# Patient Record
Sex: MALE | Race: WHITE | NOT HISPANIC OR LATINO | Employment: FULL TIME | URBAN - METROPOLITAN AREA
[De-identification: names, ages, dates, MRNs, and addresses within clinical notes are randomized per-mention and may not be internally consistent; named-entity substitution may affect disease eponyms.]

---

## 2024-03-05 DIAGNOSIS — Z00.00 GENERAL MEDICAL EXAM: Primary | ICD-10-CM

## 2024-04-16 DIAGNOSIS — Z12.9 CANCER SCREENING: Primary | ICD-10-CM

## 2024-04-19 RX ORDER — LEVOTHYROXINE SODIUM 150 UG/1
150 TABLET ORAL
COMMUNITY
Start: 2024-03-13

## 2024-04-19 RX ORDER — TADALAFIL 5 MG/1
5 TABLET ORAL
COMMUNITY
Start: 2024-03-03

## 2024-04-19 RX ORDER — TELMISARTAN 80 MG/1
80 TABLET ORAL
COMMUNITY
Start: 2024-04-14

## 2024-04-19 RX ORDER — HYDROCHLOROTHIAZIDE 12.5 MG/1
12.5 TABLET ORAL
COMMUNITY
Start: 2024-04-14

## 2024-04-19 RX ORDER — LIOTHYRONINE SODIUM 5 UG/1
5 TABLET ORAL EVERY MORNING
COMMUNITY
Start: 2024-04-04

## 2024-04-19 RX ORDER — ROSUVASTATIN CALCIUM 10 MG/1
10 TABLET, COATED ORAL NIGHTLY
COMMUNITY
Start: 2024-04-14

## 2024-04-22 ENCOUNTER — CLINICAL SUPPORT (OUTPATIENT)
Dept: INTERNAL MEDICINE | Facility: CLINIC | Age: 64
End: 2024-04-22
Payer: COMMERCIAL

## 2024-04-22 VITALS
SYSTOLIC BLOOD PRESSURE: 152 MMHG | HEART RATE: 65 BPM | OXYGEN SATURATION: 100 % | WEIGHT: 209 LBS | BODY MASS INDEX: 31.67 KG/M2 | DIASTOLIC BLOOD PRESSURE: 87 MMHG | HEIGHT: 68 IN

## 2024-04-22 DIAGNOSIS — R73.9 HYPERGLYCEMIA: ICD-10-CM

## 2024-04-22 DIAGNOSIS — R79.89 LOW TESTOSTERONE IN MALE: ICD-10-CM

## 2024-04-22 DIAGNOSIS — E03.9 HYPOTHYROIDISM (ACQUIRED): ICD-10-CM

## 2024-04-22 DIAGNOSIS — E78.2 MIXED HYPERLIPIDEMIA: ICD-10-CM

## 2024-04-22 DIAGNOSIS — Z12.9 CANCER SCREENING: Primary | ICD-10-CM

## 2024-04-22 DIAGNOSIS — Z00.00 GENERAL MEDICAL EXAM: Primary | ICD-10-CM

## 2024-04-22 DIAGNOSIS — I10 PRIMARY HYPERTENSION: ICD-10-CM

## 2024-04-22 DIAGNOSIS — D22.9 MULTIPLE ATYPICAL SKIN MOLES: ICD-10-CM

## 2024-04-22 DIAGNOSIS — R06.83 SNORING: ICD-10-CM

## 2024-04-22 DIAGNOSIS — Z12.9 CANCER SCREENING: ICD-10-CM

## 2024-04-22 DIAGNOSIS — R94.31 ABNORMAL ECG: ICD-10-CM

## 2024-04-22 DIAGNOSIS — R01.1 CARDIAC MURMUR: ICD-10-CM

## 2024-04-22 DIAGNOSIS — H91.93 BILATERAL HEARING LOSS, UNSPECIFIED HEARING LOSS TYPE: ICD-10-CM

## 2024-04-22 PROBLEM — H91.90 HEARING LOSS: Status: ACTIVE | Noted: 2024-04-22

## 2024-04-22 PROBLEM — R73.03 PREDIABETES: Status: RESOLVED | Noted: 2024-04-22 | Resolved: 2024-04-22

## 2024-04-22 PROBLEM — R73.03 PREDIABETES: Status: ACTIVE | Noted: 2024-04-22

## 2024-04-22 LAB
(HCYS)2 SERPL-MCNC: 13.7 UMOL/L (ref 5.1–15.4)
ALBUMIN SERPL-MCNC: 4.3 G/DL (ref 3.4–4.8)
ALBUMIN/GLOB SERPL: 1.3 RATIO (ref 1.1–2)
ALP SERPL-CCNC: 63 UNIT/L (ref 40–150)
ALT SERPL-CCNC: 18 UNIT/L (ref 0–55)
APPEARANCE UR: CLEAR
AST SERPL-CCNC: 32 UNIT/L (ref 5–34)
BACTERIA #/AREA URNS AUTO: ABNORMAL /HPF
BASOPHILS # BLD AUTO: 0.05 X10(3)/MCL
BASOPHILS NFR BLD AUTO: 0.8 %
BILIRUB SERPL-MCNC: 1.8 MG/DL
BILIRUB UR QL STRIP.AUTO: NEGATIVE
BUN SERPL-MCNC: 21.5 MG/DL (ref 8.4–25.7)
CALCIUM SERPL-MCNC: 9.8 MG/DL (ref 8.8–10)
CHLORIDE SERPL-SCNC: 101 MMOL/L (ref 98–107)
CHOLEST SERPL-MCNC: 150 MG/DL
CHOLEST/HDLC SERPL: 3 {RATIO} (ref 0–5)
CK SERPL-CCNC: 219 U/L (ref 30–200)
CO2 SERPL-SCNC: 29 MMOL/L (ref 23–31)
COLOR UR AUTO: ABNORMAL
CREAT SERPL-MCNC: 1.2 MG/DL (ref 0.73–1.18)
CRP SERPL HS-MCNC: 2.17 MG/L
DEPRECATED CALCIDIOL+CALCIFEROL SERPL-MC: 83.4 NG/ML (ref 30–80)
DLCO: NORMAL
EOSINOPHIL # BLD AUTO: 0.1 X10(3)/MCL (ref 0–0.9)
EOSINOPHIL NFR BLD AUTO: 1.7 %
ERYTHROCYTE [DISTWIDTH] IN BLOOD BY AUTOMATED COUNT: 11.6 % (ref 11.5–17)
EST. AVERAGE GLUCOSE BLD GHB EST-MCNC: 122.6 MG/DL
FERRITIN SERPL-MCNC: 67.48 NG/ML (ref 21.81–274.66)
FEV1/FVC: 65.6 %
FEV1: 2.54 LITERS
FVC: 3.87 LITERS
GFR SERPLBLD CREATININE-BSD FMLA CKD-EPI: >60 MLS/MIN/1.73/M2
GLOBULIN SER-MCNC: 3.3 GM/DL (ref 2.4–3.5)
GLUCOSE SERPL-MCNC: 118 MG/DL (ref 82–115)
GLUCOSE UR QL STRIP.AUTO: NORMAL
GROUP & RH: NORMAL
HBA1C MFR BLD: 5.9 %
HCT VFR BLD AUTO: 51.4 % (ref 42–52)
HCV AB SERPL QL IA: NONREACTIVE
HDLC SERPL-MCNC: 48 MG/DL (ref 35–60)
HGB BLD-MCNC: 17.8 G/DL (ref 14–18)
HIV 1+2 AB+HIV1 P24 AG SERPL QL IA: NONREACTIVE
HYALINE CASTS #/AREA URNS LPF: ABNORMAL /LPF
IMM GRANULOCYTES # BLD AUTO: 0.03 X10(3)/MCL (ref 0–0.04)
IMM GRANULOCYTES NFR BLD AUTO: 0.5 %
KETONES UR QL STRIP.AUTO: NEGATIVE
LDLC SERPL CALC-MCNC: 82 MG/DL (ref 50–140)
LEUKOCYTE ESTERASE UR QL STRIP.AUTO: NEGATIVE
LYMPHOCYTES # BLD AUTO: 1.59 X10(3)/MCL (ref 0.6–4.6)
LYMPHOCYTES NFR BLD AUTO: 26.9 %
MCH RBC QN AUTO: 29.8 PG (ref 27–31)
MCHC RBC AUTO-ENTMCNC: 34.6 G/DL (ref 33–36)
MCV RBC AUTO: 86.1 FL (ref 80–94)
MONOCYTES # BLD AUTO: 0.58 X10(3)/MCL (ref 0.1–1.3)
MONOCYTES NFR BLD AUTO: 9.8 %
MUCOUS THREADS URNS QL MICRO: ABNORMAL /LPF
NEUTROPHILS # BLD AUTO: 3.57 X10(3)/MCL (ref 2.1–9.2)
NEUTROPHILS NFR BLD AUTO: 60.3 %
NITRITE UR QL STRIP.AUTO: NEGATIVE
NRBC BLD AUTO-RTO: 0 %
PH UR STRIP.AUTO: 7 [PH]
PLATELET # BLD AUTO: 205 X10(3)/MCL (ref 130–400)
PMV BLD AUTO: 11.1 FL (ref 7.4–10.4)
POTASSIUM SERPL-SCNC: 4.5 MMOL/L (ref 3.5–5.1)
PROT SERPL-MCNC: 7.6 GM/DL (ref 5.8–7.6)
PROT UR QL STRIP.AUTO: NEGATIVE
PSA SERPL-MCNC: 1.93 NG/ML
RBC # BLD AUTO: 5.97 X10(6)/MCL (ref 4.7–6.1)
RBC #/AREA URNS AUTO: ABNORMAL /HPF
RBC UR QL AUTO: NEGATIVE
SODIUM SERPL-SCNC: 139 MMOL/L (ref 136–145)
SP GR UR STRIP.AUTO: 1.02 (ref 1–1.03)
SQUAMOUS #/AREA URNS LPF: ABNORMAL /HPF
T3FREE SERPL-MCNC: 3.28 PG/ML (ref 1.58–3.91)
T4 FREE SERPL-MCNC: 1.09 NG/DL (ref 0.7–1.48)
TESTOST SERPL-MCNC: 865.38 NG/DL (ref 220.91–715.81)
TLC: NORMAL
TRIGL SERPL-MCNC: 99 MG/DL (ref 34–140)
TSH SERPL-ACNC: 1.83 UIU/ML (ref 0.35–4.94)
URATE SERPL-MCNC: 8.8 MG/DL (ref 3.5–7.2)
UROBILINOGEN UR STRIP-ACNC: NORMAL
VLDLC SERPL CALC-MCNC: 20 MG/DL
WBC # SPEC AUTO: 5.92 X10(3)/MCL (ref 4.5–11.5)
WBC #/AREA URNS AUTO: ABNORMAL /HPF

## 2024-04-22 PROCEDURE — 80061 LIPID PANEL: CPT

## 2024-04-22 PROCEDURE — 82306 VITAMIN D 25 HYDROXY: CPT

## 2024-04-22 PROCEDURE — 99499 UNLISTED E&M SERVICE: CPT | Mod: ,,, | Performed by: FAMILY MEDICINE

## 2024-04-22 PROCEDURE — 80053 COMPREHEN METABOLIC PANEL: CPT

## 2024-04-22 PROCEDURE — 36415 COLL VENOUS BLD VENIPUNCTURE: CPT | Mod: ,,, | Performed by: FAMILY MEDICINE

## 2024-04-22 PROCEDURE — 84443 ASSAY THYROID STIM HORMONE: CPT

## 2024-04-22 PROCEDURE — 83090 ASSAY OF HOMOCYSTEINE: CPT

## 2024-04-22 PROCEDURE — 84550 ASSAY OF BLOOD/URIC ACID: CPT

## 2024-04-22 PROCEDURE — 81479 UNLISTED MOLECULAR PATHOLOGY: CPT | Mod: WS,,, | Performed by: FAMILY MEDICINE

## 2024-04-22 PROCEDURE — 93005 ELECTROCARDIOGRAM TRACING: CPT | Mod: ,,, | Performed by: FAMILY MEDICINE

## 2024-04-22 PROCEDURE — 86141 C-REACTIVE PROTEIN HS: CPT

## 2024-04-22 PROCEDURE — 86901 BLOOD TYPING SEROLOGIC RH(D): CPT | Performed by: FAMILY MEDICINE

## 2024-04-22 PROCEDURE — 92551 PURE TONE HEARING TEST AIR: CPT | Mod: ,,, | Performed by: FAMILY MEDICINE

## 2024-04-22 PROCEDURE — 82550 ASSAY OF CK (CPK): CPT

## 2024-04-22 PROCEDURE — 84481 FREE ASSAY (FT-3): CPT

## 2024-04-22 PROCEDURE — 84153 ASSAY OF PSA TOTAL: CPT

## 2024-04-22 PROCEDURE — 84439 ASSAY OF FREE THYROXINE: CPT

## 2024-04-22 PROCEDURE — 82728 ASSAY OF FERRITIN: CPT

## 2024-04-22 PROCEDURE — 84403 ASSAY OF TOTAL TESTOSTERONE: CPT

## 2024-04-22 PROCEDURE — 93010 ELECTROCARDIOGRAM REPORT: CPT | Mod: ,,, | Performed by: INTERNAL MEDICINE

## 2024-04-22 PROCEDURE — 85025 COMPLETE CBC W/AUTO DIFF WBC: CPT

## 2024-04-22 PROCEDURE — 86803 HEPATITIS C AB TEST: CPT

## 2024-04-22 PROCEDURE — 87389 HIV-1 AG W/HIV-1&-2 AB AG IA: CPT

## 2024-04-22 PROCEDURE — 81001 URINALYSIS AUTO W/SCOPE: CPT

## 2024-04-22 PROCEDURE — 99387 INIT PM E/M NEW PAT 65+ YRS: CPT | Mod: ,,, | Performed by: FAMILY MEDICINE

## 2024-04-22 PROCEDURE — 94010 BREATHING CAPACITY TEST: CPT | Mod: ,,, | Performed by: FAMILY MEDICINE

## 2024-04-22 PROCEDURE — 99172 OCULAR FUNCTION SCREEN: CPT | Mod: ,,, | Performed by: FAMILY MEDICINE

## 2024-04-22 PROCEDURE — 83036 HEMOGLOBIN GLYCOSYLATED A1C: CPT

## 2024-04-22 PROCEDURE — 0358T BIA WHOLE BODY: CPT | Mod: ,,, | Performed by: FAMILY MEDICINE

## 2024-04-22 RX ORDER — GUAIFENESIN/PHENYLPROPANOLAMIN
500 EXPECTORANT ORAL DAILY
COMMUNITY

## 2024-04-22 RX ORDER — CHOLECALCIFEROL (VITAMIN D3) 25 MCG
1000 TABLET ORAL DAILY
COMMUNITY

## 2024-04-22 NOTE — ASSESSMENT & PLAN NOTE
New murmur. Recommend further evaluation and treatment by a cardiologists. Will hold off on doing the exercise stress test today due to new finding.

## 2024-04-22 NOTE — ASSESSMENT & PLAN NOTE
Fasting glucose and HbA1c is elevated and may be due to prediabetes.   Recommend comparing this to prior lab results or repeating testing at a later date.   Follow a healthy diet that is low in processed carbs and simple sugars. See the ADA website diabetes.org

## 2024-04-22 NOTE — ASSESSMENT & PLAN NOTE
Keep a blood pressure log and follow up with your primary care physician if your home readings are consistently higher than 140/90.   Continue taking your medications as prescribed.

## 2024-04-22 NOTE — PROGRESS NOTES
[facility]  Marleny Driscoll MD      Subjective      Rich Sanford is a 64 y.o. year old male, who presents today for a preventive medical evaluation.      This is Mr. Rich Sanford's first visit to the [facility] for a wellness visit. He reports that since he was last seen by his physician, which was in 2023, he has had no major illnesses or injuries.     Mr. Sanford states that as a child he had a heart murmur and was told 10 years ago by a doctor that they heard a murmur but not since then. He reports no chest pain or dyspnea on exertion. He did mention that last year he had a harder time hiking than years past. He was able to complete the hike. His home blood pressure readings are controlled in the 130s/70s. He exercises most days with weight lifting and currently is not doing much aerobic exercises.       CURRENT MEDICAL PROBLEMS     He has high blood pressure and hyperlipidemia for which he takes daily medications.   He also has hypothroidism which is also treated. He has not had surgery or radiation on his thyroid in the past.   Mr. Sanford has low testosterone and takes weekly supplement injections which are working well.   He reports low vitamin D levels for which he takes supplements as well.   Mr. Sanford has bilateral hearing loss and has hearing aids.     Current Medications    Current Outpatient Medications:     hydroCHLOROthiazide (HYDRODIURIL) 12.5 MG Tab, Take 12.5 mg by mouth., Disp: , Rfl:     levothyroxine (SYNTHROID) 150 MCG tablet, Take 150 mcg by mouth., Disp: , Rfl:     liothyronine (CYTOMEL) 5 MCG Tab, Take 5 mcg by mouth every morning., Disp: , Rfl:     NON FORMULARY MEDICATION, Zinc+Calcium tablet-once daily, Disp: , Rfl:     NON FORMULARY MEDICATION, Bio-X4, Disp: , Rfl:     NON FORMULARY MEDICATION, Amazing Grass Greens Blend Super Food Drink, Disp: , Rfl:     rosuvastatin (CRESTOR) 10 MG tablet, Take 10 mg by mouth every evening., Disp: ,  Rfl:     saw palmetto 500 MG capsule, Take 500 mg by mouth once daily., Disp: , Rfl:     tadalafiL (CIALIS) 5 MG tablet, Take 5 mg by mouth., Disp: , Rfl:     telmisartan (MICARDIS) 80 MG Tab, Take 80 mg by mouth., Disp: , Rfl:     vitamin D (VITAMIN D3) 1000 units Tab, Take 1,000 Units by mouth once daily., Disp: , Rfl:     NON FORMULARY MEDICATION, Testosterone Cypionate 200mg/ml-0.4ml Weekly IM Injection, Disp: , Rfl:      Care Team 274}  Patient Care Team:  Edilberto Calvo MD as PCP - General  Wood Woodward MD (Dermatology)  Tim Burks MD as Consulting Physician (General Surgery)  Reddy Narayanan (Inactive) (Colon and Rectal Surgery)  Carlos Crouch Sr., MD as Consulting Physician      PAST MEDICAL HISTORY   274}  Immunizations:  He has his tetanus booster in the last 10 years.  He has not received the annual flu vaccine.  He  has not received his Pneumonia vaccine.   He reports he has taken both Hepatitis A and B vaccines.  He has not taken his Shingles vaccine.   He  has taken his Covid Vaccine.     Drug Allergies:274}  Review of patient's allergies indicates:  No Known Allergies     Medical Illnesses:  See Current Medical Problems and Review of System.    Surgical Illnesses:   Past Surgical History:   Procedure Laterality Date    DISTAL BICEPS TENDON REPAIR Right 2007    NEVUS EXCISION  1963    TONSILLECTOMY  1963        Diagnostic Studies (year completed):  EKG, 2017  CT of Chest, 2021  Colonoscopy, done at 37 yo and 47yo- both clear. Last one done at 57yo. No polyps.        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 He lives at home with his wife of 19 years. They have 4 children. He has 3 grandchildren. Ages 8, 7, 3.     Personal Habits  He does not smoke or use tobacco products. He drinks approximately 2-3 alcoholic beverages per month. He drinks approximately 12 caffeinated beverages per week. He reports that he is currently following a  nonrestrictive diet. Nearly half of his meals are prepared at home and eats out 14 times per week. He drinks 28 glasses of water a week. On average, he obtains 7 hours of sleep each night.    Exercise History  Currently, he exercises on average 5 days a week for 30 minutes or more each time. For resistance training he lifts weight, 7 days per week. And for flexibility training he stretches during weight lifting.     Stress Factors  The patient considers his home life to be low stress and his occupation to be of moderate stress. His greatest source of worry or concern is his  correction plan and societal events . He feels that he manages his stress fairly well most of the time. He rates his emotional outlook on life as generally happy. He rates his overall health as good.    FAMILY HISTORY     The patient's father  at the age of 67 from esophageal cancer. He was an alcoholic and smoker. His mother is alive at the age of 90 years old and has had breast cancer in her 50s. He has 2 siblings, ages 61 and 68 who have von Willebrand's disease. He has 3 biological children, ages 37, 39 and 40, and reports one of his children has von Willebrand's disease.    REVIEW OF SYSTEMS     Review of Systems   Constitutional: No fever, no chills, no sweats, no weakness, no fatigue  Eye: No recent visual problems, no blurry vision, no visual disturbances  ENT: No ear pain, no change in hearing, no nasal congestion, no sore throat  Respiratory: No shortness of breath, no cough, no wheezing, + snoring with witness apneic episodes  Cardiovascular: No chest pain, no palpitations, no peripheral edema, no calf pain or swelling  Gastrointestinal: No nausea, no vomiting, no diarrhea, no constipation, no abdominal pain, no melena or rectal bleeding  Male : No difficulty with urine stream, strength or flow rate. No polyuria, no nocturia, no sexual problems  Genitourinary: No dysuria, no hematuria, no excessive urination, no frequent UTIs,  "no urinary incontinence  Immunologic: No recurrent fevers, no malaise  Hematology/lymphatics: No swollen lymph glands  Musculoskeletal: No joint pain today, reports past left toe pain and right lateral foot pain-red, swelling,  no joint swelling today, no trouble with gait.   Integumentary: No rashes, no skin lesions  Neurologic: Alert, oriented ×4, no abnormal balance, no confusion, no numbness, no tingling, no headache  Psychiatric: No anxiety, no depression, no sleeping problems        Objective     PHYSICAL EXAM   274}  BP (!) 152/87 (BP Location: Left arm, Patient Position: Sitting, BP Method: Large (Automatic))   Pulse 65   Ht 5' 8" (1.727 m)   Wt 94.8 kg (209 lb)   SpO2 100%   BMI 31.78 kg/m²     Physical Exam   General: Alert and oriented, no acute distress. well nourished, well kept, good hydration  Eye: Pupils are equal, round and reactive to light, extraocular movements are intact  HEENT: OP clear, moist mucus membranes, no cervical LAD, ear canals clear- normal TM bilaterally  Respiratory: Lungs are clear to auscultation, breath sounds are equal and symmetric  Cardiovascular: Normal rate, regular rhythm, +3/6 systolic ejection murmur loudest at RUSB, no gallop, no edema  Gastrointestinal: Soft, nontender, nondistended, normal bowel sounds, no organomegaly  Genitourinary: No CVA tenderness  Musculoskeletal: Normal range of motion, no swelling, normal gait.  Integumentary: Warm, dry, pink, no rash, multiple benign and atypical moles located on sun exposed areas.   Neurologic: Alert, oriented, no focal deficits, normal reflexes  Psychiatric: Cooperative, appropriate mood and affect, normal judgment, nonsuicidal    LABORATORY RESULTS    274}    Clinical Support on 04/22/2024   Component Date Value Ref Range Status    FEV1 04/22/2024 2.54  liters Final    FVC 04/22/2024 3.87  liters Final    FEV1/FVC 04/22/2024 65.6  % Final    QRS Duration 04/22/2024 90  ms Final    OHS QTC Calculation 04/22/2024 443  " ms Final    Vit D 25 OH 04/22/2024 83.4 (H)  30.0 - 80.0 ng/mL Final    Uric Acid 04/22/2024 8.8 (H)  3.5 - 7.2 mg/dL Final    Color, UA 04/22/2024 Light-Yellow  Yellow, Light-Yellow, Dark Yellow, Leona, Straw Final    Appearance, UA 04/22/2024 Clear  Clear Final    Specific Gravity, UA 04/22/2024 1.017  1.005 - 1.030 Final    pH, UA 04/22/2024 7.0  5.0 - 8.5 Final    Protein, UA 04/22/2024 Negative  Negative Final    Glucose, UA 04/22/2024 Normal  Negative, Normal Final    Ketones, UA 04/22/2024 Negative  Negative Final    Blood, UA 04/22/2024 Negative  Negative Final    Bilirubin, UA 04/22/2024 Negative  Negative Final    Urobilinogen, UA 04/22/2024 Normal  0.2, 1.0, Normal Final    Nitrites, UA 04/22/2024 Negative  Negative Final    Leukocyte Esterase, UA 04/22/2024 Negative  Negative Final    WBC, UA 04/22/2024 0-5  None Seen, 0-2, 3-5, 0-5 /HPF Final    Bacteria, UA 04/22/2024 None Seen  None Seen /HPF Final    Squamous Epithelial Cells, UA 04/22/2024 None Seen  None Seen /HPF Final    Mucous, UA 04/22/2024 Trace (A)  None Seen /LPF Final    Hyaline Casts, UA 04/22/2024 None Seen  None Seen /lpf Final    RBC, UA 04/22/2024 None Seen  None Seen, 0-2, 3-5, 0-5 /HPF Final    TSH 04/22/2024 1.831  0.350 - 4.940 uIU/mL Final    Testosterone Total 04/22/2024 865.38 (H)  220.91 - 715.81 ng/dL Final    Thyroxine Free 04/22/2024 1.09  0.70 - 1.48 ng/dL Final    T3 Free 04/22/2024 3.28  1.58 - 3.91 pg/mL Final    Prostate Specific Antigen 04/22/2024 1.93  <=4.00 ng/mL Final    Cholesterol Total 04/22/2024 150  <=200 mg/dL Final    HDL Cholesterol 04/22/2024 48  35 - 60 mg/dL Final    Triglyceride 04/22/2024 99  34 - 140 mg/dL Final    Cholesterol/HDL Ratio 04/22/2024 3  0 - 5 Final    Very Low Density Lipoprotein 04/22/2024 20   Final    LDL Cholesterol 04/22/2024 82.00  50.00 - 140.00 mg/dL Final    Homocysteine Total 04/22/2024 13.7  5.1 - 15.4 umol/L Final    HIV 04/22/2024 Nonreactive  Nonreactive Final    Hep C Ab  Interp 04/22/2024 Nonreactive  Nonreactive Final    Hemoglobin A1c 04/22/2024 5.9  <=7.0 % Final    Estimated Average Glucose 04/22/2024 122.6  mg/dL Final    Ferritin Level 04/22/2024 67.48  21.81 - 274.66 ng/mL Final    C-Reactive Protein High Sensitivity 04/22/2024 2.17  <=5.00 mg/L Final    Sodium Level 04/22/2024 139  136 - 145 mmol/L Final    Potassium Level 04/22/2024 4.5  3.5 - 5.1 mmol/L Final    Chloride 04/22/2024 101  98 - 107 mmol/L Final    Carbon Dioxide 04/22/2024 29  23 - 31 mmol/L Final    Glucose Level 04/22/2024 118 (H)  82 - 115 mg/dL Final    Blood Urea Nitrogen 04/22/2024 21.5  8.4 - 25.7 mg/dL Final    Creatinine 04/22/2024 1.20 (H)  0.73 - 1.18 mg/dL Final    Calcium Level Total 04/22/2024 9.8  8.8 - 10.0 mg/dL Final    Protein Total 04/22/2024 7.6  5.8 - 7.6 gm/dL Final    Albumin Level 04/22/2024 4.3  3.4 - 4.8 g/dL Final    Globulin 04/22/2024 3.3  2.4 - 3.5 gm/dL Final    Albumin/Globulin Ratio 04/22/2024 1.3  1.1 - 2.0 ratio Final    Bilirubin Total 04/22/2024 1.8 (H)  <=1.5 mg/dL Final    Alkaline Phosphatase 04/22/2024 63  40 - 150 unit/L Final    Alanine Aminotransferase 04/22/2024 18  0 - 55 unit/L Final    Aspartate Aminotransferase 04/22/2024 32  5 - 34 unit/L Final    eGFR 04/22/2024 >60  mls/min/1.73/m2 Final    Creatine Kinase 04/22/2024 219 (H)  30 - 200 U/L Final    Group & Rh 04/22/2024 O POS   Final    WBC 04/22/2024 5.92  4.50 - 11.50 x10(3)/mcL Final    RBC 04/22/2024 5.97  4.70 - 6.10 x10(6)/mcL Final    Hgb 04/22/2024 17.8  14.0 - 18.0 g/dL Final    Hct 04/22/2024 51.4  42.0 - 52.0 % Final    MCV 04/22/2024 86.1  80.0 - 94.0 fL Final    MCH 04/22/2024 29.8  27.0 - 31.0 pg Final    MCHC 04/22/2024 34.6  33.0 - 36.0 g/dL Final    RDW 04/22/2024 11.6  11.5 - 17.0 % Final    Platelet 04/22/2024 205  130 - 400 x10(3)/mcL Final    MPV 04/22/2024 11.1 (H)  7.4 - 10.4 fL Final    Neut % 04/22/2024 60.3  % Final    Lymph % 04/22/2024 26.9  % Final    Mono % 04/22/2024 9.8  %  Final    Eos % 04/22/2024 1.7  % Final    Basophil % 04/22/2024 0.8  % Final    Lymph # 04/22/2024 1.59  0.6 - 4.6 x10(3)/mcL Final    Neut # 04/22/2024 3.57  2.1 - 9.2 x10(3)/mcL Final    Mono # 04/22/2024 0.58  0.1 - 1.3 x10(3)/mcL Final    Eos # 04/22/2024 0.10  0 - 0.9 x10(3)/mcL Final    Baso # 04/22/2024 0.05  <=0.2 x10(3)/mcL Final    IG# 04/22/2024 0.03  0 - 0.04 x10(3)/mcL Final    IG% 04/22/2024 0.5  % Final    NRBC% 04/22/2024 0.0  % Final       Any results pending at the time of completion of note will be routed to the physician and patient for review and outreach performed if indicated.     IMAGING RESULTS     see official reports    Abdominal US--Cholelithiasis   Carotid US--No evidence of a hemodynamically significant carotid bifurcation stenosis.   Pelvic US--Unremarkable appearance of the bladder. 29 g prostate.  DEXA scan--Normal bone marrow density   Calcium Score--Your total calcium score is 100.  Low to Moderate coronary heart disease risk  Small nodules in the right middle lobe, the largest measuring 5.5 mm in diameter.  In a patient at low risk for malignancy, no further follow-up suggested.  In a high risk patient, such as a smoker, 12 month follow-up low-dose CT suggested.      IN OFFICE TEST RESULTS     Vision/Auditory  Hearing Screening    500Hz 1000Hz 2000Hz 4000Hz   Right ear 40 Fail Fail Fail   Left ear 40 40 Fail Fail     Vision Screening    Right eye Left eye Both eyes   Without correction   20/40   With correction      Comments: Last Eye exam-4/2024 at [facility] in [place], [place]    Far Acuity 20/40  Near Acuity 20/100    Lasix surgery approx 20 yrs ago.      Spirometry   FVC 91.4% predictive  FEV1 80.6% predictive  FEV1/FVC 88.1% predictive  Interpretation: Mild obstruction    Resting ECG:  Vent. Rate : 067 BPM     Atrial Rate : 067 BPM         P-R Int : 152 ms          QRS Dur : 090 ms          QT Int : 420 ms       P-R-T Axes : 050 036 143 degrees         QTc Int : 443 ms      Normal sinus rhythm   ST and T wave abnormality, consider lateral ischemia   Abnormal ECG     Exercise Treadmill Stress Test  Deferred today due to abnormal physical exam finding of murmur.       InBody Assessment:   04/23/2024    BMI: 31.8    Percent Body Fat: 29.7%    Visceral Fat Area: 120.9 cm2    Recommendations: Body fat mass loss of 36lbs          Assessment    IMPRESSION & PLAN    274}  Cancer Screening    Impression   All recommended cancer screening exams are up to date.    Plan Continue yearly wellness visits as planned.      Immunizations    Impression Not all recommended immunizations are up to date. These include: Influenza vaccine, Shingles vaccine, RSV, and Pneumonia vaccine   Plan It is recommended that you speak to your primary care physician about receiving your vaccinations.      Physical Exam    Impression Normal except for:   -Elevated blood pressure reading: keep a bp log and follow up with your primary care physician  -Cardiac murmur: recommend further evaluation and treatment by a cardiologist  -Multiple benign and atypical moles: recommend further evaluation by a dermatologist    Plan See above       Laboratory Results    Impression Normal except for:   -Elevated creatinine with normal GFR: Maintain a normal blood pressure and stay hydrated. Avoid antiinflammatories and follow up with your primary  care physician for further monitoring.   -Elevated fasting sugar with mildly elevated HbA1c: this is Prediabetes. See recommendations listed at the end of the note  -Elevated Uric Acid: consider treatment to keep uric acid low to prevent gout flares and protect kidney function. Recommend discussing with your primary care physician.   -Elevated total bilirubin with normal liver function tests:  -Elevated CPK: This is nonspecific finding. No further testing needed at this time. Likely due to recent exercise routine.    Plan See above     Imaging Results    Impression Normal except for:    -Incidental lung nodules: no further testing needed  -Low to moderate coronary artery disease risk: no further testing needed.   -Gallstones present on abdominal ultrasound: no further testing needed  -Mildly enlarged prostate: no further testing needed   Plan See above     Hearing Screening    Impression Abnormal   Plan Recommend wearing hearing aids. Repeat in 1 year.     Vision Screening    Impression Normal   Plan Repeat in 1 year.     Lung Function Screening   Impression Abnormal   Plan Consider further testing if cardiology testing is normal.  Repeat in 1 year or sooner if problems arise.      Electrocardiogram   Impression Abnormal.   Plan Follow up with cardiology.     Cardiovascular Risk Assessment   Impression NO score: 11.1%   Intermediate Risk (7.5 to 19.9%)   Plan Follow a heart healthy diet (see details below), maintain a healthy blood pressure (<140/80) and weight (BMI 25-29.9). Follow a regular aerobic exercise routine (see exercise prescription below)  Continue your statin therapy to keep your LDL less than 100mg/dL  Consider a diet such as the Dietary Approaches to Stop Hypertension (DASH) diet which can help to lower blood pressure.     In Body Assessment   Impression  See results and recommendations above   Plan Exercise Prescription         Type: Aerobic: walking, stationary cycling, aquatic exercise, running         Frequency: 3-5 sessions/week         Intensity: Moderate (RPE: 10-13)          Time: 30 minutes (150 minutes/week)              Plus         Type: Strength/Resistance Training: exercises using resistance bands, weight machines, handheld  weights or body weight         Frequency: 2-3 session/week               Plus         Type: Flexibility training: pre/post workout stretching, yoga, evans chi         Frequency: 5-7 sessions/week         Time: 5-10 minutes       Plan Diet Recommendations:  Follow a heart healthy diet such as the Mediterranean-style diet.   Eat an overall healthy  "dietary pattern that emphasizes:   -a wide variety of fruits and vegetables   -whole grains and products made up mostly of whole grains   -healthy sources of protein (mostly plants such as legumes and nuts; fish and seafood; low fat or nonfat dairy; and, if you eat meat and poultry, ensuring it is lean and unprocessed)   -liquid non-tropical vegetable oils   -minimally processed foods   -minimized intake of added sugars   -food prepared with little or no salt   -limited or preferably no alcohol intake        TDEE: 2,795 calories  This is your "Total Daily Energy Expenditure" and is the amount of calories required to maintain current body weight when your activity level is factored in.    Recommended daily calorie intake to promote weight loss with moderate exercise (3-5 days/week): 2,295 calories/day    BMR: 1,811 calories  This is your "Basal Metabolic Rate". This is the amount of calories required to maintain current body weight with no activity.         MEDICAL PROBLEMS ADDRESSED TODAY     1. General medical exam  -     see above    2. Cancer screening  -     [facility] Multi-Cancer Screen    3. Cardiac murmur  Assessment & Plan:  New murmur. Recommend further evaluation and treatment by a cardiologists. Will hold off on doing the exercise stress test today due to new finding.   Orders:  -     Ambulatory referral/consult to Cardiology; Future; Expected date: 04/29/2024    4. Snoring  Assessment & Plan:  Recommend a sleep study.     5. Primary hypertension  Assessment & Plan:  Keep a blood pressure log and follow up with your primary care physician if your home readings are consistently higher than 140/90.   Continue taking your medications as prescribed.   Orders:  -     Ambulatory referral/consult to Cardiology; Future; Expected date: 04/29/2024    6. Mixed hyperlipidemia  Assessment & Plan:  Controlled. Continue your current medication and follow up with your doctor as planned.   Orders:  -     Ambulatory " referral/consult to Cardiology; Future; Expected date: 04/29/2024    7. Low testosterone in male  Assessment & Plan:  Controlled. Continue your current medication and follow up with your doctor as planned.     8. Bilateral hearing loss, unspecified hearing loss type    9. Hypothyroidism (acquired)  Assessment & Plan:  Controlled. Continue your current medications and follow up with your PCP as planned.     10. Hyperglycemia  Assessment & Plan:  Fasting glucose and HbA1c is elevated and may be due to prediabetes.   Recommend comparing this to prior lab results or repeating testing at a later date.   Follow a healthy diet that is low in processed carbs and simple sugars. See the ADA website diabetes.org    11. Multiple atypical skin moles  Assessment & Plan:  Recommend further evaluation and treatment by a dermatologist and treatment is indicated.     12. Abnormal ECG  Assessment & Plan:  See cardiology. Referral in chart.       FINAL RECOMMENDATIONS     See cardiology for further evaluation and treatment of your cardiac murmur and for further cardiac testing if indicated.   See dermatology for further evaluation and treatment of your numerous atypical moles.   Discuss your elevated uric acid level with your primary care physician and the need for treatment.   Get a sleep study to evaluate your snoring and apneic episodes while sleeping.   Continue to monitor your blood pressure at home and report any consistently elevated readings to your doctor.   Follow a low sugar (diabetic diet) and follow up with your primary care physician regularly to ensure you do not develop diabetes.  Consider getting caught up on your vaccinations. Speak to your primary care physician about these: Pneumonia, Shingles, Influenza and RSV    Early identification and prevention are the keys to maintaining overall health and prevention of chronic diseases. For this reason, I recommend yearly physical examinations through this program or with  your primary care physician.     Follow up in about 1 year (around 4/22/2025) for an Executive Health Physical.    It was a pleasure taking care of you, Mr. Bentone. Thank you for using the [facility].            [facility]  [address]   [place], [place]  [address]

## 2024-04-23 PROBLEM — R94.31 ABNORMAL ECG: Status: ACTIVE | Noted: 2024-04-23

## 2024-04-23 LAB
OHS QRS DURATION: 90 MS
OHS QTC CALCULATION: 443 MS

## 2024-04-25 ENCOUNTER — TELEPHONE (OUTPATIENT)
Dept: HEMATOLOGY/ONCOLOGY | Facility: CLINIC | Age: 64
End: 2024-04-25
Payer: COMMERCIAL

## 2024-05-02 ENCOUNTER — TELEPHONE (OUTPATIENT)
Dept: HEMATOLOGY/ONCOLOGY | Facility: CLINIC | Age: 64
End: 2024-05-02
Payer: COMMERCIAL

## 2024-05-02 LAB — GALLERI NO PREDICTED SIGNAL ORIGIN: NORMAL

## 2024-05-02 NOTE — TELEPHONE ENCOUNTER
"I called patient to review Galleri results "No cancer signal detected", and reminded him this was not a replacement of cancer screening. This is a blood test for a right here and right now, and Fede recommends annual screening. Patient verbalized understanding and was grateful for the call. No further questions at this time.  "

## 2025-02-28 DIAGNOSIS — Z00.00 GENERAL MEDICAL EXAM: Primary | ICD-10-CM

## 2025-04-17 ENCOUNTER — TELEPHONE (OUTPATIENT)
Dept: INTERNAL MEDICINE | Facility: CLINIC | Age: 65
End: 2025-04-17
Payer: COMMERCIAL

## 2025-04-17 DIAGNOSIS — Z00.00 GENERAL MEDICAL EXAM: Primary | ICD-10-CM

## 2025-04-17 RX ORDER — ROSUVASTATIN CALCIUM 20 MG/1
20 TABLET, COATED ORAL
COMMUNITY
Start: 2025-04-07

## 2025-04-17 NOTE — LETTER
This communication is flagged as high priority.        AUTHORIZATION FOR RELEASE OF   CONFIDENTIAL INFORMATION    Dear Medical Records,    We are seeing Rich Sanford, date of birth 1960, in the clinic at formerly [facility] on . Rich Sanford has an outstanding lab/procedure at the time we reviewed his chart. In order to help keep his health information updated, he has authorized us to request the following medical record(s):        (  )  MAMMOGRAM                                      (  )  COLONOSCOPY      (  )  PAP SMEAR                                          (  )  OUTSIDE LAB RESULTS     (  )  DEXA SCAN                                          (  )  EYE EXAM            (  )  FOOT EXAM                                          (  )  ENTIRE RECORD     (  )  OUTSIDE IMMUNIZATIONS                 (  x)  Progress Note & ETT______________         Please fax records to [facility], Dr. Fallon McManus, [phone]     If you have any questions, please contact Sera Regalado LPN  at [phone].           Patient Name: Rich Sanford  : 1960  Patient Phone #: [phone]

## 2025-04-21 ENCOUNTER — CLINICAL SUPPORT (OUTPATIENT)
Dept: INTERNAL MEDICINE | Facility: CLINIC | Age: 65
End: 2025-04-21
Payer: COMMERCIAL

## 2025-04-21 VITALS
WEIGHT: 202 LBS | BODY MASS INDEX: 30.62 KG/M2 | HEART RATE: 66 BPM | TEMPERATURE: 98 F | DIASTOLIC BLOOD PRESSURE: 87 MMHG | HEIGHT: 68 IN | SYSTOLIC BLOOD PRESSURE: 151 MMHG | OXYGEN SATURATION: 99 %

## 2025-04-21 DIAGNOSIS — R01.1 CARDIAC MURMUR: ICD-10-CM

## 2025-04-21 DIAGNOSIS — E79.0 HYPERURICEMIA: ICD-10-CM

## 2025-04-21 DIAGNOSIS — R73.9 HYPERGLYCEMIA: ICD-10-CM

## 2025-04-21 DIAGNOSIS — Z00.00 GENERAL MEDICAL EXAM: Primary | ICD-10-CM

## 2025-04-21 DIAGNOSIS — R94.31 ABNORMAL ECG: ICD-10-CM

## 2025-04-21 DIAGNOSIS — R73.03 PREDIABETES: ICD-10-CM

## 2025-04-21 DIAGNOSIS — D22.9 ATYPICAL MOLE: ICD-10-CM

## 2025-04-21 DIAGNOSIS — E78.2 MIXED HYPERLIPIDEMIA: ICD-10-CM

## 2025-04-21 DIAGNOSIS — E03.9 HYPOTHYROIDISM (ACQUIRED): ICD-10-CM

## 2025-04-21 DIAGNOSIS — R79.89 LOW TESTOSTERONE IN MALE: ICD-10-CM

## 2025-04-21 DIAGNOSIS — I10 PRIMARY HYPERTENSION: ICD-10-CM

## 2025-04-21 LAB
(HCYS)2 SERPL-MCNC: 15.6 UMOL/L (ref 5.1–15.4)
25(OH)D3+25(OH)D2 SERPL-MCNC: 114 NG/ML (ref 30–80)
ALBUMIN SERPL-MCNC: 4.2 G/DL (ref 3.4–4.8)
ALBUMIN/GLOB SERPL: 1.3 RATIO (ref 1.1–2)
ALP SERPL-CCNC: 74 UNIT/L (ref 40–150)
ALT SERPL-CCNC: 24 UNIT/L (ref 0–55)
ANION GAP SERPL CALC-SCNC: 9 MEQ/L
AST SERPL-CCNC: 29 UNIT/L (ref 11–45)
BACTERIA #/AREA URNS AUTO: ABNORMAL /HPF
BASOPHILS # BLD AUTO: 0.04 X10(3)/MCL
BASOPHILS NFR BLD AUTO: 0.7 %
BILIRUB SERPL-MCNC: 2.7 MG/DL
BILIRUB UR QL STRIP.AUTO: NEGATIVE
BUN SERPL-MCNC: 19.2 MG/DL (ref 8.4–25.7)
CALCIUM SERPL-MCNC: 9.3 MG/DL (ref 8.8–10)
CHLORIDE SERPL-SCNC: 101 MMOL/L (ref 98–107)
CHOLEST SERPL-MCNC: 125 MG/DL
CHOLEST/HDLC SERPL: 3 {RATIO} (ref 0–5)
CK SERPL-CCNC: 234 U/L (ref 30–200)
CLARITY UR: CLEAR
CO2 SERPL-SCNC: 28 MMOL/L (ref 23–31)
COLOR UR AUTO: COLORLESS
CREAT SERPL-MCNC: 1.31 MG/DL (ref 0.72–1.25)
CREAT/UREA NIT SERPL: 15
CRP SERPL HS-MCNC: 1.12 MG/L
DLCO: NORMAL
EOSINOPHIL # BLD AUTO: 0.12 X10(3)/MCL (ref 0–0.9)
EOSINOPHIL NFR BLD AUTO: 2 %
ERYTHROCYTE [DISTWIDTH] IN BLOOD BY AUTOMATED COUNT: 12.6 % (ref 11.5–17)
EST. AVERAGE GLUCOSE BLD GHB EST-MCNC: 122.6 MG/DL
FERRITIN SERPL-MCNC: 45.66 NG/ML (ref 21.81–274.66)
FEV1/FVC: 65.3 %
FEV1: 2.59 LITERS
FVC: 3.97 LITERS
GFR SERPLBLD CREATININE-BSD FMLA CKD-EPI: 60 ML/MIN/1.73/M2
GLOBULIN SER-MCNC: 3.2 GM/DL (ref 2.4–3.5)
GLUCOSE SERPL-MCNC: 109 MG/DL (ref 82–115)
GLUCOSE UR QL STRIP: NORMAL
HBA1C MFR BLD: 5.9 %
HCT VFR BLD AUTO: 50 % (ref 42–52)
HCV AB SERPL QL IA: NONREACTIVE
HDLC SERPL-MCNC: 44 MG/DL (ref 35–60)
HGB BLD-MCNC: 17.2 G/DL (ref 14–18)
HGB UR QL STRIP: NEGATIVE
HIV 1+2 AB+HIV1 P24 AG SERPL QL IA: NONREACTIVE
HYALINE CASTS #/AREA URNS LPF: ABNORMAL /LPF
IMM GRANULOCYTES # BLD AUTO: 0.03 X10(3)/MCL (ref 0–0.04)
IMM GRANULOCYTES NFR BLD AUTO: 0.5 %
KETONES UR QL STRIP: NEGATIVE
LDLC SERPL CALC-MCNC: 63 MG/DL (ref 50–140)
LEUKOCYTE ESTERASE UR QL STRIP: NEGATIVE
LYMPHOCYTES # BLD AUTO: 1.51 X10(3)/MCL (ref 0.6–4.6)
LYMPHOCYTES NFR BLD AUTO: 25.2 %
MCH RBC QN AUTO: 29.7 PG (ref 27–31)
MCHC RBC AUTO-ENTMCNC: 34.4 G/DL (ref 33–36)
MCV RBC AUTO: 86.4 FL (ref 80–94)
MONOCYTES # BLD AUTO: 0.51 X10(3)/MCL (ref 0.1–1.3)
MONOCYTES NFR BLD AUTO: 8.5 %
NEUTROPHILS # BLD AUTO: 3.79 X10(3)/MCL (ref 2.1–9.2)
NEUTROPHILS NFR BLD AUTO: 63.1 %
NITRITE UR QL STRIP: NEGATIVE
NRBC BLD AUTO-RTO: 0 %
PH UR STRIP: 6.5 [PH]
PLATELET # BLD AUTO: 203 X10(3)/MCL (ref 130–400)
PMV BLD AUTO: 11.4 FL (ref 7.4–10.4)
POTASSIUM SERPL-SCNC: 3.7 MMOL/L (ref 3.5–5.1)
PROT SERPL-MCNC: 7.4 GM/DL (ref 5.8–7.6)
PROT UR QL STRIP: NEGATIVE
PSA SERPL-MCNC: 1.79 NG/ML
RBC # BLD AUTO: 5.79 X10(6)/MCL (ref 4.7–6.1)
RBC #/AREA URNS AUTO: ABNORMAL /HPF
SODIUM SERPL-SCNC: 138 MMOL/L (ref 136–145)
SP GR UR STRIP.AUTO: 1.01 (ref 1–1.03)
SQUAMOUS #/AREA URNS LPF: ABNORMAL /HPF
T3FREE SERPL-MCNC: 2.79 PG/ML (ref 1.58–3.91)
T4 FREE SERPL-MCNC: 0.99 NG/DL (ref 0.7–1.48)
TESTOST SERPL-MCNC: 679.52 NG/DL (ref 220.91–715.81)
TLC: NORMAL
TRIGL SERPL-MCNC: 90 MG/DL (ref 34–140)
TSH SERPL-ACNC: 4.68 UIU/ML (ref 0.35–4.94)
URATE SERPL-MCNC: 9.3 MG/DL (ref 3.5–7.2)
UROBILINOGEN UR STRIP-ACNC: NORMAL
VLDLC SERPL CALC-MCNC: 18 MG/DL
WBC # BLD AUTO: 6 X10(3)/MCL (ref 4.5–11.5)
WBC #/AREA URNS AUTO: ABNORMAL /HPF

## 2025-04-21 PROCEDURE — 84153 ASSAY OF PSA TOTAL: CPT

## 2025-04-21 PROCEDURE — 94010 BREATHING CAPACITY TEST: CPT | Mod: ,,, | Performed by: FAMILY MEDICINE

## 2025-04-21 PROCEDURE — 82550 ASSAY OF CK (CPK): CPT

## 2025-04-21 PROCEDURE — 0358T BIA WHOLE BODY: CPT | Mod: ,,, | Performed by: FAMILY MEDICINE

## 2025-04-21 PROCEDURE — 99387 INIT PM E/M NEW PAT 65+ YRS: CPT | Mod: ,,, | Performed by: FAMILY MEDICINE

## 2025-04-21 PROCEDURE — 81001 URINALYSIS AUTO W/SCOPE: CPT

## 2025-04-21 PROCEDURE — 99499 UNLISTED E&M SERVICE: CPT | Mod: ,,, | Performed by: FAMILY MEDICINE

## 2025-04-21 PROCEDURE — 84550 ASSAY OF BLOOD/URIC ACID: CPT

## 2025-04-21 PROCEDURE — 92551 PURE TONE HEARING TEST AIR: CPT | Mod: ,,, | Performed by: FAMILY MEDICINE

## 2025-04-21 PROCEDURE — 99172 OCULAR FUNCTION SCREEN: CPT | Mod: ,,, | Performed by: FAMILY MEDICINE

## 2025-04-21 PROCEDURE — 84403 ASSAY OF TOTAL TESTOSTERONE: CPT

## 2025-04-21 PROCEDURE — 86141 C-REACTIVE PROTEIN HS: CPT

## 2025-04-21 PROCEDURE — 83090 ASSAY OF HOMOCYSTEINE: CPT

## 2025-04-21 PROCEDURE — 87389 HIV-1 AG W/HIV-1&-2 AB AG IA: CPT

## 2025-04-21 PROCEDURE — 93005 ELECTROCARDIOGRAM TRACING: CPT | Mod: ,,, | Performed by: FAMILY MEDICINE

## 2025-04-21 PROCEDURE — 80053 COMPREHEN METABOLIC PANEL: CPT

## 2025-04-21 PROCEDURE — 82728 ASSAY OF FERRITIN: CPT

## 2025-04-21 PROCEDURE — 86803 HEPATITIS C AB TEST: CPT

## 2025-04-21 PROCEDURE — 80061 LIPID PANEL: CPT

## 2025-04-21 PROCEDURE — 84481 FREE ASSAY (FT-3): CPT

## 2025-04-21 PROCEDURE — 83036 HEMOGLOBIN GLYCOSYLATED A1C: CPT

## 2025-04-21 PROCEDURE — 84443 ASSAY THYROID STIM HORMONE: CPT

## 2025-04-21 PROCEDURE — 85025 COMPLETE CBC W/AUTO DIFF WBC: CPT

## 2025-04-21 PROCEDURE — 82306 VITAMIN D 25 HYDROXY: CPT

## 2025-04-21 PROCEDURE — 84439 ASSAY OF FREE THYROXINE: CPT

## 2025-04-21 PROCEDURE — 93010 ELECTROCARDIOGRAM REPORT: CPT | Mod: ,,, | Performed by: INTERNAL MEDICINE

## 2025-04-21 RX ORDER — MELOXICAM 15 MG/1
15 TABLET ORAL
COMMUNITY
Start: 2025-01-23

## 2025-04-21 NOTE — ASSESSMENT & PLAN NOTE
You have prediabetes.     Work to consume no more than 9 teaspoons (36 grams or 150 calories) of added sugar per day.     Follow a healthy diet that is low in processed carbs and simple sugars. See the ADA website diabetes.org

## 2025-04-21 NOTE — ASSESSMENT & PLAN NOTE
Limit the amount of foods that are high in purines. A gout diet would help with this:     The general principles of a gout diet follow typical healthy-diet recommendations:  Weight loss. Being overweight increases the risk of developing gout, and losing weight lowers the risk of gout. Research suggests that reducing the number of calories and losing weight -- even without a purine-restricted diet -- lower uric acid levels and reduce the number of gout attacks. Losing weight also lessens the overall stress on joints.  Complex carbs. Eat more fruits, vegetables and whole grains, which provide complex carbohydrates. Avoid foods and beverages with high-fructose corn syrup, and limit consumption of naturally sweet fruit juices.  Water. Stay well-hydrated by drinking water.  Fats. Cut back on saturated fats from red meat, fatty poultry and high-fat dairy products.  Proteins. Focus on lean meat and poultry, low-fat dairy and lentils as sources of protein.    Recommendations for specific foods or supplements include:  Organ and glandular meats. Avoid meats such as liver, kidney and sweetbreads, which have high purine levels and contribute to high blood levels of uric acid.  Red meat. Limit serving sizes of beef, lamb and pork.  Seafood. Some types of seafood -- such as anchovies, shellfish, sardines and tuna -- are higher in purines than are other types. But the overall health benefits of eating fish may outweigh the risks for people with gout. Moderate portions of fish can be part of a gout diet.  High-purine vegetables. Studies have shown that vegetables high in purines, such as asparagus and spinach, don't increase the risk of gout or recurring gout attacks.  Alcohol. Beer and distilled liquors are associated with an increased risk of gout and recurring attacks. Moderate consumption of wine doesn't appear to increase the risk of gout attacks. Avoid alcohol during gout attacks, and limit alcohol, especially beer, between  attacks.  Sugary foods and beverages. Limit or avoid sugar-sweetened foods such as sweetened cereals, bakery goods and candies. Limit consumption of naturally sweet fruit juices.  Vitamin C. Vitamin C may help lower uric acid levels. Talk to your doctor about whether a 500-milligram vitamin C supplement fits into your diet and medication plan.  Coffee. Some research suggests that drinking coffee in moderation, especially regular caffeinated coffee, may be associated with a reduced risk of gout. Drinking coffee may not be appropriate if you have other medical conditions. Talk to your doctor about how much coffee is right for you.  Cherries. There is some evidence that eating cherries is associated with a reduced risk of gout attacks.

## 2025-04-21 NOTE — ASSESSMENT & PLAN NOTE
Continue your medication.   Monitor your blood pressure at home. Use a mid range over the counter blood pressure arm cuff. Take your blood pressure daily or every other day both in the morning and late afternoons. Report consistently elevated blood pressures to your primary care physician. These would be blood pressure readings where the top number is above 140 and/or the bottom number is above 90.

## 2025-04-21 NOTE — ASSESSMENT & PLAN NOTE
Here are some lifestyle changes that can help improve your prediabetes and reduce your risk of developing type 2 diabetes:  Eat a balanced diet:  Choose whole grains like brown rice, quinoa, and whole wheat bread instead of refined carbs.  Include high-fiber foods such as beans, lentils, vegetables, and fruits.  Opt for lean proteins like chicken, fish, tofu, or legumes.  Use healthy fats from sources like olive oil, avocados, and nuts.  Limit added sugars, sugary drinks, and highly processed snacks.  Be physically active: Aim for at least 30 minutes of moderate exercise (like walking) most days of the week.  Maintain a healthy weight: Even losing 5-10% of your body weight can make a big difference.  Get enough sleep: Aim for 7-9 hours of quality sleep each night.  Manage stress: Practice relaxation techniques like deep breathing, yoga, or mindfulness.  Avoid smoking and limit alcohol: Both can negatively affect blood sugar levels.  Making small, consistent changes over time can have a big impact on your health.

## 2025-04-21 NOTE — PROGRESS NOTES
[facility]  Marleny Driscoll MD      Subjective      Rich Sanford is a 65 y.o. year old male, who presents today for a preventive medical evaluation.      Mr. Martinez reports since his last [facility] physical he has had no major illnesses or injuries.     Following last year's visit it was recommended that he see cardiology for a new murmur found on exam. He met with Dr. Blake with [facility] and had an echocardiogram done with showed moderate to severe mitral regurgitation. He had an abnormal ECG and elevated CT Ca+ Score (150). A PET stress done which showed no ischemia.    He was instructed to see dermaoltogy for several abnormal appearing moles. He has not seen anyone today and would like to see Dr. Durant  His uric acid, glucose and blood pressure were elevated and it was recommended that he see his PCP to address these.   Due to his complaint of snoring it was recommended that he get a sleep study. He got this done at home and he did not need a CPAP.     CURRENT MEDICAL PROBLEMS     He has moderate to severe mitral regurgitation found on echocardiogram in 2024. He has high blood pressure and hyperlipidemia for which he takes daily medications.   He also has hypothroidism which is also treated. He has not had surgery or radiation on his thyroid in the past.   Mr. Sanford has low testosterone and takes weekly supplement injections which are working well.   He reports low vitamin D levels for which he takes supplements as well.   Mr. Sanford has bilateral hearing loss and has hearing aids.       Current Medications    Current Outpatient Medications:     meloxicam (MOBIC) 15 MG tablet, Take 15 mg by mouth as needed., Disp: , Rfl:     rosuvastatin (CRESTOR) 20 MG tablet, Take 20 mg by mouth., Disp: , Rfl:     hydroCHLOROthiazide (HYDRODIURIL) 12.5 MG Tab, Take 12.5 mg by mouth., Disp: , Rfl:     levothyroxine (SYNTHROID) 150 MCG tablet, Take 150 mcg by mouth., Disp: , Rfl:     liothyronine (CYTOMEL) 5 MCG  Tab, Take 5 mcg by mouth every morning., Disp: , Rfl:     NON FORMULARY MEDICATION, Testosterone Cypionate 200mg/ml-0.4ml Weekly IM Injection, Disp: , Rfl:     NON FORMULARY MEDICATION, Zinc+Calcium tablet-once daily, Disp: , Rfl:     saw palmetto 500 MG capsule, Take 500 mg by mouth once daily., Disp: , Rfl:     tadalafiL (CIALIS) 5 MG tablet, Take 5 mg by mouth., Disp: , Rfl:     telmisartan (MICARDIS) 80 MG Tab, Take 80 mg by mouth., Disp: , Rfl:     vitamin D (VITAMIN D3) 1000 units Tab, Take 1,000 Units by mouth once daily., Disp: , Rfl:      Care Team 274}  Patient Care Team:  No, Primary Doctor as PCP - Wood Cannon MD (Dermatology)  Tim Burks MD as Consulting Physician (General Surgery)  Reddy Narayanan (Inactive) (Colon and Rectal Surgery)  Carlos Crouch Sr., MD as Consulting Physician  Shade Blake MD as Consulting Physician (Cardiology)  Edilberto Calvo MD as Primary Care      PAST MEDICAL HISTORY   274}  Immunizations:  He has his tetanus booster in the last 10 years.  He has received the annual flu vaccine.  He has received his Pneumonia vaccine.   He reports he has taken both Hepatitis A and B vaccines.  He has taken his Shingles vaccine.   He has not taken his RSV vaccine.   He has taken his initial Covid Vaccine series.    Drug Allergies:274}  Review of patient's allergies indicates:  No Known Allergies     Medical Illnesses:  See Current Medical Problems and Review of System.    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 He lives at home with his wife of 20 years. They have 4 children. He has 3 grandchildren. Ages 9, 8, 4.     Personal Habits  He does not smoke or use tobacco products. He was a past smoker and quit cigarettes in . He occasionally smokes cigars and a tobacco pipe.  He drinks approximately 2 alcoholic beverages per month. He drinks approximately 12 caffeinated beverages per week. He reports that he is currently following a nonrestrictive diet but reports his appetite has decreased over the last couple of years. Nearly all of his meals are prepared at home and eats out 2-3 times per week. He drinks 28 glasses of water a week. On average, he obtains 7 hours of sleep each night.     Exercise History  Currently, he exercises on average 5 days a week for 45 minutes each time. For aerobic activity he walks, 4 days per week. For resistance training he lifts weight, 5-6 days per week. And for flexibility training he stretches during weight lifting.     Stress Factors  The patient considers his home life to be low stress and his occupation to be of low stress. His greatest source of worry or concern is his family. He feels that he manages his stress very well most of the time. He rates his emotional outlook on life as generally happy. He rates his overall health as good.    FAMILY HISTORY     The patient's father  at the age of 67 from esophageal cancer. He was an alcoholic and smoker. His mother is alive at the age of 91 years old and has had breast cancer in her 50s. He has 2 siblings, ages 62 and 69 who have von Willebrand's disease. He has 3 biological children, ages 37 40 and 41, and reports one of his children has von Willebrand's disease.     REVIEW OF SYSTEMS     Review of Systems   Constitutional: No fever, no chills, no sweats, no weakness, no fatigue  Eye: No recent visual problems, no blurry vision, no  "visual disturbances  ENT: No ear pain, no change in hearing, no nasal congestion, no sore throat  Respiratory: No shortness of breath, no cough, no wheezing, no snoring  Cardiovascular: No chest pain, no palpitations, no peripheral edema, no calf pain or swelling  Gastrointestinal: No nausea, no vomiting, no diarrhea, no constipation, no abdominal pain, no melena or rectal bleeding  Male : No difficulty with urine stream, strength or flow rate. No polyuria, no nocturia, no sexual problems  Genitourinary: No dysuria, no hematuria, no excessive urination, no frequent UTIs, no urinary incontinence  Immunologic: No recurrent fevers, no malaise  Hematology/lymphatics: No swollen lymph glands  Musculoskeletal: No joint pain, no joint swelling, no trouble with gait.   Integumentary: No rashes, no skin lesions  Neurologic: Alert, oriented ×4, no abnormal balance, no confusion, no numbness, no tingling, no headache  Psychiatric: No anxiety, no depression, no sleeping problems        Objective     PHYSICAL EXAM   274}  BP (!) 151/87 (BP Location: Right arm, Patient Position: Sitting)   Pulse 66   Temp 98.3 °F (36.8 °C) (Oral)   Ht 5' 8" (1.727 m)   Wt 91.6 kg (202 lb)   SpO2 99%   BMI 30.71 kg/m²       Physical Exam   General: Alert and oriented, no acute distress. well nourished, well kept, good hydration  Eye: Pupils are equal, round and reactive to light, extraocular movements are intact  HEENT: OP clear, moist mucus membranes, no cervical LAD, ear canals clear- normal TM bilaterally  Respiratory: Lungs are clear to auscultation, breath sounds are equal and symmetric  Cardiovascular: Normal rate, regular rhythm, +3/6 systolic ejection murmur loudest at RUSB, no gallop, no edema  Gastrointestinal: Soft, nontender, nondistended, normal bowel sounds, no organomegaly  Genitourinary: No CVA tenderness  Musculoskeletal: Normal range of motion, no swelling, normal gait.  Integumentary: Warm, dry, pink, no rash, multiple " benign and atypical moles located on sun exposed areas. 5mm raised red mole located midsternal at the nipple line-atypical in appearance.   Neurologic: Alert, oriented, no focal deficits, normal reflexes  Psychiatric: Cooperative, appropriate mood and affect, normal judgment, nonsuicidal    LABORATORY RESULTS    274}    Clinical Support on 04/21/2025   Component Date Value Ref Range Status    FEV1 04/21/2025 2.59  liters Final    FVC 04/21/2025 3.97  liters Final    FEV1/FVC 04/21/2025 65.3  % Final    QRS Duration 04/21/2025 94  ms Final-Edited    [facility]HS QTC Calculation 04/21/2025 464  ms Final-Edited    Vitamin D 04/21/2025 114 (H)  30 - 80 ng/mL Final    Uric Acid 04/21/2025 9.3 (H)  3.5 - 7.2 mg/dL Final    Color, UA 04/21/2025 Colorless (A)  Yellow, Light-Yellow, Dark Yellow, Leona, Straw Final    Appearance, UA 04/21/2025 Clear  Clear Final    Specific Gravity, UA 04/21/2025 1.007  1.005 - 1.030 Final    pH, UA 04/21/2025 6.5  5.0 - 8.5 Final    Protein, UA 04/21/2025 Negative  Negative Final    Glucose, UA 04/21/2025 Normal  Negative, Normal Final    Ketones, UA 04/21/2025 Negative  Negative Final    Blood, UA 04/21/2025 Negative  Negative Final    Bilirubin, UA 04/21/2025 Negative  Negative Final    Urobilinogen, UA 04/21/2025 Normal  0.2, 1.0, Normal Final    Nitrites, UA 04/21/2025 Negative  Negative Final    Leukocyte Esterase, UA 04/21/2025 Negative  Negative Final    RBC, UA 04/21/2025 0-5  None Seen, 0-2, 3-5, 0-5 /HPF Final    WBC, UA 04/21/2025 0-5  None Seen, 0-2, 3-5, 0-5 /HPF Final    Bacteria, UA 04/21/2025 None Seen  None Seen /HPF Final    Squamous Epithelial Cells, UA 04/21/2025 None Seen  None Seen /HPF Final    Hyaline Casts, UA 04/21/2025 None Seen  None Seen /lpf Final    TSH 04/21/2025 4.683  0.350 - 4.940 uIU/mL Final    Testosterone Total 04/21/2025 679.52  220.91 - 715.81 ng/dL Final    Thyroxine Free 04/21/2025 0.99  0.70 - 1.48 ng/dL Final    T3 Free 04/21/2025 2.79  1.58 - 3.91 pg/mL  Final    Prostate Specific Antigen 04/21/2025 1.79  <=4.00 ng/mL Final    Cholesterol Total 04/21/2025 125  <=200 mg/dL Final    HDL Cholesterol 04/21/2025 44  35 - 60 mg/dL Final    Triglyceride 04/21/2025 90  34 - 140 mg/dL Final    Cholesterol/HDL Ratio 04/21/2025 3  0 - 5 Final    Very Low Density Lipoprotein 04/21/2025 18   Final    LDL Cholesterol 04/21/2025 63.00  50.00 - 140.00 mg/dL Final    LDL calculated using the Friedewald equation.    Homocysteine 04/21/2025 15.6 (H)  5.1 - 15.4 umol/L Final    HIV 04/21/2025 Nonreactive  Nonreactive Final    Hep C Ab Interp 04/21/2025 Nonreactive  Nonreactive Final    Hemoglobin A1c 04/21/2025 5.9  <=7.0 % Final    Estimated Average Glucose 04/21/2025 122.6  mg/dL Final    Ferritin Level 04/21/2025 45.66  21.81 - 274.66 ng/mL Final    C-Reactive Protein High Sensitivity 04/21/2025 1.12  <=5.00 mg/L Final    Sodium 04/21/2025 138  136 - 145 mmol/L Final    Potassium 04/21/2025 3.7  3.5 - 5.1 mmol/L Final    Chloride 04/21/2025 101  98 - 107 mmol/L Final    CO2 04/21/2025 28  23 - 31 mmol/L Final    Glucose 04/21/2025 109  82 - 115 mg/dL Final    Blood Urea Nitrogen 04/21/2025 19.2  8.4 - 25.7 mg/dL Final    Creatinine 04/21/2025 1.31 (H)  0.72 - 1.25 mg/dL Final    Calcium 04/21/2025 9.3  8.8 - 10.0 mg/dL Final    Protein Total 04/21/2025 7.4  5.8 - 7.6 gm/dL Final    Albumin 04/21/2025 4.2  3.4 - 4.8 g/dL Final    Globulin 04/21/2025 3.2  2.4 - 3.5 gm/dL Final    Albumin/Globulin Ratio 04/21/2025 1.3  1.1 - 2.0 ratio Final    Bilirubin Total 04/21/2025 2.7 (H)  <=1.5 mg/dL Final    ALP 04/21/2025 74  40 - 150 unit/L Final    ALT 04/21/2025 24  0 - 55 unit/L Final    AST 04/21/2025 29  11 - 45 unit/L Final    eGFR 04/21/2025 60  mL/min/1.73/m2 Final    Estimated GFR calculated using the CKD-EPI creatinine (2021) equation.    Anion Gap 04/21/2025 9.0  mEq/L Final    BUN/Creatinine Ratio 04/21/2025 15   Final    Creatine Kinase 04/21/2025 234 (H)  30 - 200 U/L Final     WBC 04/21/2025 6.00  4.50 - 11.50 x10(3)/mcL Final    RBC 04/21/2025 5.79  4.70 - 6.10 x10(6)/mcL Final    Hgb 04/21/2025 17.2  14.0 - 18.0 g/dL Final    Hct 04/21/2025 50.0  42.0 - 52.0 % Final    MCV 04/21/2025 86.4  80.0 - 94.0 fL Final    MCH 04/21/2025 29.7  27.0 - 31.0 pg Final    MCHC 04/21/2025 34.4  33.0 - 36.0 g/dL Final    RDW 04/21/2025 12.6  11.5 - 17.0 % Final    Platelet 04/21/2025 203  130 - 400 x10(3)/mcL Final    MPV 04/21/2025 11.4 (H)  7.4 - 10.4 fL Final    Neut % 04/21/2025 63.1  % Final    Lymph % 04/21/2025 25.2  % Final    Mono % 04/21/2025 8.5  % Final    Eos % 04/21/2025 2.0  % Final    Basophil % 04/21/2025 0.7  % Final    Imm Grans % 04/21/2025 0.5  % Final    Neut # 04/21/2025 3.79  2.1 - 9.2 x10(3)/mcL Final    Lymph # 04/21/2025 1.51  0.6 - 4.6 x10(3)/mcL Final    Mono # 04/21/2025 0.51  0.1 - 1.3 x10(3)/mcL Final    Eos # 04/21/2025 0.12  0 - 0.9 x10(3)/mcL Final    Baso # 04/21/2025 0.04  <=0.2 x10(3)/mcL Final    Imm Gran # 04/21/2025 0.03  0.00 - 0.04 x10(3)/mcL Final    NRBC% 04/21/2025 0.0  % Final       Any results pending at the time of completion of note will be routed to the physician and patient for review and outreach performed if indicated.     IMAGING RESULTS     see official reports    Abdominal US--Cholelithiasis. Subcentimeter simple right hepatic lobe cyst.  Carotid US--No evidence of a hemodynamically significant carotid bifurcation stenosis.   Pelvic US--No significant postvoid residual.  Enlarged prostate gland.  DEXA scan--Normal.  Low Dose Chest CT-- Lung-RADS Category: 2 - Benign Appearance or Behavior - continue annual screening with LDCT in 12 months. Clinically or potentially clinically significant non lung cancer finding:  S - Significant. Prior Lung Cancer Modifier:  No history of prior lung cancer. Mild emphysema.  Splenomegaly. Multiple juxtapleural nodules as above.    IN OFFICE TEST RESULTS     Vision/Auditory  Hearing Screening    500Hz 1000Hz 2000Hz  4000Hz   Right ear 40 40 Fail Fail   Left ear 40 Fail Fail Fail     Vision Screening    Right eye Left eye Both eyes   Without correction   20/70   With correction      Comments: Last eye exam-March 2024  Far Acuity-20/70  Near Acuity 20/100        Spirometry   FVC 94.6% predictive  FEV1 83.3% predictive  FEV1/FVC 87.9% predictive  Interpretation: Mild obstruction    Resting ECG:  Vent. Rate :  70 BPM     Atrial Rate :  70 BPM      P-R Int : 172 ms          QRS Dur :  94 ms       QT Int : 430 ms       P-R-T Axes :  55  23 136 degrees     QTcB Int : 464 ms     Normal sinus rhythm   Minimal voltage criteria for LVH, may be normal variant   ST and T wave abnormality, consider lateral ischemia   Prolonged QT   Abnormal ECG     Exercise Treadmill Stress Test  Deferred today due to currently being followed by cardiology.         InBody Assessment:   04/22/2025 04/23/2024   BMI: 30.8  Percent Body Fat: 28%  Visceral Fat Area: 113.6 cm2  Recommendations: Body fat mass loss of 31lbs BMI: 31.8     Percent Body Fat: 29.7%    Visceral Fat Area: 120.9 cm2    Recommendations: Body fat mass loss of 36lbs             Assessment    IMPRESSION & PLAN    274}  Cancer Screening   Impression   All recommended cancer screening exams are up to date.    Plan Continue yearly wellness visits as planned.      Immunizations   Impression Not all recommended immunizations are up to date. These include: RSV vaccine   Plan Speak to your PCP about this vaccine.      Physical Exam   Impression Normal except for:   -Elevated blood pressure reading: keep a bp log and follow up with your primary care physician  -Cardiac murmur: continue care with your cardiologist  -Multiple benign and atypical moles: recommend further evaluation by a dermatologist    Plan See above       Laboratory Results   Impression Normal except for:   -elevated creatine level: Maintain a normal blood pressure and stay hydrated. Avoid antiinflammatories and follow up with your  primary care physician for further monitoring.   -elevated uric acid level: consider treatment to keep uric acid low to prevent gout flares and protect kidney function. Recommend discussing with your primary care physician.   -elevated bilirubin level with normal liver function testing: Recommend further workup to determine cause of elevation. See your primary care physician regarding this if needed.   -elevated CPK level:This is nonspecific finding. No further testing needed at this time. Likely due to recent exercise routine.   -elevated homocysteine level: Recommend further testing to ensure not due to a vitamin deficiency. See your primary care physician.   -elevated vitamin D level: cut back your vitamin D supplement to every other day and recheck in 3-6 months.   -elevated HbA1c test: this is Prediabetes. See recommendations listed at the end of the note    Plan      Imaging Results   Impression Normal except for:   -Enlarged prostate: monitor for urine symptoms and repeat imaging in 1 year  -Subcentimeter right hepatic lobe cyst: Repeat imaging in 1 year  -Benign appearing nodules in lungs: Repeat Low dose chest CT next year.   -Gallstones present on abdominal ultrasound: repeat imaging in 1 year.    Plan See above     Hearing Screening   Impression Abnormal. Wear hearing aids   Plan Repeat in 1 year.     Vision Screening    Impression Abnormal. Wear glasses and see ophthalmology as planned   Plan Repeat in 1 year.     Lung Function Screening   Impression Abnormal. Mild obstruction and unchanged from last year.    Plan Repeat in 1 year.     ECG & Exercise Treadmill Stress Test   Impression See Impression reported above.  ECG is abnormal. See Cardiology as planned.   Plan Repeat in 1 year.     Cardiovascular Risk Assessment   Impression NO score: 11.8%   Intermediate Risk (7.5 to 19.9%)  The Multi-Ethnic Study of Atherosclerosis (NO) Risk Score is a cardiovascular risk calculator that predicts the  "likelihood of coronary heart disease over 10 years. It can be used with your coronary artery calcium values, which are obtained from a chest CT scan.    Plan Follow a heart healthy diet (see details below), maintain a healthy blood pressure (<140/80) and weight (BMI 25-29.9). Follow a regular aerobic exercise routine (see exercise prescription below)  Continue your daily statin therapy and consider a diet such as the Dietary Approaches to Stop Hypertension (DASH) diet which can help to lower blood pressure.     In Body Assessment   Impression  See results and recommendations above   Plan Exercise Prescription         Type: Aerobic: walking, stationary cycling, aquatic exercise, running         Frequency: 3-5 sessions/week         Intensity: Moderate (RPE: 10-13)          Time: 30 minutes (150 minutes/week)              Plus         Type: Strength/Resistance Training: exercises using resistance bands, weight machines, handheld  weights or body weight         Frequency: 2-3 session/week               Plus         Type: Flexibility training: pre/post workout stretching, yoga, evans chi         Frequency: 5-7 sessions/week         Time: 5-10 minutes       Plan Diet Recommendations:  Follow a heart healthy diet such as the Mediterranean-style diet.   Eat an overall healthy dietary pattern that emphasizes:   -a wide variety of fruits and vegetables   -whole grains and products made up mostly of whole grains   -healthy sources of protein (mostly plants such as legumes and nuts; fish and seafood; low fat or nonfat dairy; and, if you eat meat and poultry, ensuring it is lean and unprocessed)   -liquid non-tropical vegetable oils   -minimally processed foods   -minimized intake of added sugars   -food prepared with little or no salt   -limited or preferably no alcohol intake        TDEE: 3096 calories  This is your "Total Daily Energy Expenditure" and is the amount of calories required to maintain current body weight when your " "activity level is factored in.    Recommended daily calorie intake to promote weight loss with heavy exercise (6-7 days/week): 2596 calories/day    BMR: 1798 calories  This is your "Basal Metabolic Rate". This is the amount of calories required to maintain current body weight with no activity.         HEALTH TOPICS ADDRESSED TODAY     1. General medical exam  -     Fede Multi-Cancer Screen  -     InBody Composition  -     Pulmonary function test  -     Visual screen, automated w/color vision  -     Hearing screen  -     Cancel: Exercise Stress - EKG  -     EKG 12-lead  -     X-Ray Chest PA And Lateral  -     Vitamin D  -     Uric Acid  -     Urinalysis, Reflex to Urine Culture  -     TSH  -     Testosterone  -     T4, Free  -     T3, Free (OLG)  -     PSA, Screening  -     Lipid Panel  -     Homocysteine, Serum  -     HIV 1/2 Ag/Ab (4th Gen)  -     Hepatitis C Antibody  -     Hemoglobin A1C  -     Ferritin  -     CRP, High Sensitivity  -     Comprehensive Metabolic Panel  -     CK  -     CBC Auto Differential    2. Primary hypertension  Assessment & Plan:  Continue your medication.   Monitor your blood pressure at home. Use a mid range over the counter blood pressure arm cuff. Take your blood pressure daily or every other day both in the morning and late afternoons. Report consistently elevated blood pressures to your primary care physician. These would be blood pressure readings where the top number is above 140 and/or the bottom number is above 90.         3. Mixed hyperlipidemia  Assessment & Plan:  Controlled. Continue your current medication and follow up with your doctor as planned.         4. Hyperglycemia  Assessment & Plan:  You have prediabetes.     Work to consume no more than 9 teaspoons (36 grams or 150 calories) of added sugar per day.     Follow a healthy diet that is low in processed carbs and simple sugars. See the ADA website diabetes.org      5. Prediabetes  Assessment & Plan:  Here are some " lifestyle changes that can help improve your prediabetes and reduce your risk of developing type 2 diabetes:  Eat a balanced diet:  Choose whole grains like brown rice, quinoa, and whole wheat bread instead of refined carbs.  Include high-fiber foods such as beans, lentils, vegetables, and fruits.  Opt for lean proteins like chicken, fish, tofu, or legumes.  Use healthy fats from sources like olive oil, avocados, and nuts.  Limit added sugars, sugary drinks, and highly processed snacks.  Be physically active: Aim for at least 30 minutes of moderate exercise (like walking) most days of the week.  Maintain a healthy weight: Even losing 5-10% of your body weight can make a big difference.  Get enough sleep: Aim for 7-9 hours of quality sleep each night.  Manage stress: Practice relaxation techniques like deep breathing, yoga, or mindfulness.  Avoid smoking and limit alcohol: Both can negatively affect blood sugar levels.  Making small, consistent changes over time can have a big impact on your health.      6. Hyperuricemia  Assessment & Plan:  Limit the amount of foods that are high in purines. A gout diet would help with this:     The general principles of a gout diet follow typical healthy-diet recommendations:  Weight loss. Being overweight increases the risk of developing gout, and losing weight lowers the risk of gout. Research suggests that reducing the number of calories and losing weight -- even without a purine-restricted diet -- lower uric acid levels and reduce the number of gout attacks. Losing weight also lessens the overall stress on joints.  Complex carbs. Eat more fruits, vegetables and whole grains, which provide complex carbohydrates. Avoid foods and beverages with high-fructose corn syrup, and limit consumption of naturally sweet fruit juices.  Water. Stay well-hydrated by drinking water.  Fats. Cut back on saturated fats from red meat, fatty poultry and high-fat dairy products.  Proteins. Focus on  lean meat and poultry, low-fat dairy and lentils as sources of protein.    Recommendations for specific foods or supplements include:  Organ and glandular meats. Avoid meats such as liver, kidney and sweetbreads, which have high purine levels and contribute to high blood levels of uric acid.  Red meat. Limit serving sizes of beef, lamb and pork.  Seafood. Some types of seafood -- such as anchovies, shellfish, sardines and tuna -- are higher in purines than are other types. But the overall health benefits of eating fish may outweigh the risks for people with gout. Moderate portions of fish can be part of a gout diet.  High-purine vegetables. Studies have shown that vegetables high in purines, such as asparagus and spinach, don't increase the risk of gout or recurring gout attacks.  Alcohol. Beer and distilled liquors are associated with an increased risk of gout and recurring attacks. Moderate consumption of wine doesn't appear to increase the risk of gout attacks. Avoid alcohol during gout attacks, and limit alcohol, especially beer, between attacks.  Sugary foods and beverages. Limit or avoid sugar-sweetened foods such as sweetened cereals, bakery goods and candies. Limit consumption of naturally sweet fruit juices.  Vitamin C. Vitamin C may help lower uric acid levels. Talk to your doctor about whether a 500-milligram vitamin C supplement fits into your diet and medication plan.  Coffee. Some research suggests that drinking coffee in moderation, especially regular caffeinated coffee, may be associated with a reduced risk of gout. Drinking coffee may not be appropriate if you have other medical conditions. Talk to your doctor about how much coffee is right for you.  Cherries. There is some evidence that eating cherries is associated with a reduced risk of gout attacks.        7. Cardiac murmur  Assessment & Plan:  Continue care with your cardiologist as planned.       8. Abnormal ECG  Assessment & Plan:  Continue  care with your cardiologist as planned.       9. Low testosterone in male  Assessment & Plan:  Controlled. Continue your current medication and follow up with your doctor as planned.       10. Hypothyroidism (acquired)  Assessment & Plan:  Controlled. Continue your current medications and follow up with your PCP as planned.       11. Atypical mole  -     Ambulatory referral/consult to Dermatology; Future; Expected date: 04/28/2025          FINAL RECOMMENDATIONS     See your primary care physician to discuss further evaluation and/or treatment of the following:  Elevated uric acid level  Elevated vitamin D level  Elevated creatinine level  Elevated bilirubin level  Elevated fasting sugar and HbA1c test  Elevated homocysteine level  RSV vaccine  Continue care with your cardiologist as planned.   Following a heart-healthy, low-purine, and prediabetic-friendly diet can greatly improve your overall well-being and help manage multiple health concerns at once. By choosing nutrient-rich, whole foods and limiting red meat, added sugars, and high-purine items like organ meats and certain seafood, youll support your heart, reduce uric acid levels, and keep your blood sugar in check--all essential steps toward feeling your best.    Early identification and prevention are the keys to maintaining overall health and prevention of chronic diseases. For this reason, I recommend yearly physical examinations through this program or with your primary care physician.     Follow up in about 1 year (around 4/21/2026) for an Executive Health Physical.    It was a pleasure taking care of you, Mr. Sanford. Thank you for using the [facility].            [facility]  [address]   [place] [id number]

## 2025-04-22 LAB
OHS QRS DURATION: 94 MS
OHS QTC CALCULATION: 464 MS

## 2025-05-05 ENCOUNTER — RESULTS FOLLOW-UP (OUTPATIENT)
Dept: INTERNAL MEDICINE | Facility: CLINIC | Age: 65
End: 2025-05-05

## 2025-05-21 ENCOUNTER — TELEPHONE (OUTPATIENT)
Dept: INTERNAL MEDICINE | Facility: CLINIC | Age: 65
End: 2025-05-21
Payer: COMMERCIAL